# Patient Record
Sex: FEMALE | Race: WHITE
[De-identification: names, ages, dates, MRNs, and addresses within clinical notes are randomized per-mention and may not be internally consistent; named-entity substitution may affect disease eponyms.]

---

## 2019-10-17 ENCOUNTER — HOSPITAL ENCOUNTER (EMERGENCY)
Dept: HOSPITAL 76 - ED | Age: 39
Discharge: HOME | End: 2019-10-17
Payer: COMMERCIAL

## 2019-10-17 VITALS — DIASTOLIC BLOOD PRESSURE: 86 MMHG | SYSTOLIC BLOOD PRESSURE: 131 MMHG

## 2019-10-17 DIAGNOSIS — F43.20: ICD-10-CM

## 2019-10-17 DIAGNOSIS — R07.89: Primary | ICD-10-CM

## 2019-10-17 LAB
ALBUMIN DIAFP-MCNC: 4.1 G/DL (ref 3.2–5.5)
ALBUMIN/GLOB SERPL: 1.2 {RATIO} (ref 1–2.2)
ALP SERPL-CCNC: 56 IU/L (ref 42–121)
ALT SERPL W P-5'-P-CCNC: 15 IU/L (ref 10–60)
ANION GAP SERPL CALCULATED.4IONS-SCNC: 6 MMOL/L (ref 6–13)
AST SERPL W P-5'-P-CCNC: 19 IU/L (ref 10–42)
BASOPHILS NFR BLD AUTO: 0 10^3/UL (ref 0–0.1)
BASOPHILS NFR BLD AUTO: 0.5 %
BILIRUB BLD-MCNC: 0.6 MG/DL (ref 0.2–1)
BUN SERPL-MCNC: 11 MG/DL (ref 6–20)
CALCIUM UR-MCNC: 9.1 MG/DL (ref 8.5–10.3)
CHLORIDE SERPL-SCNC: 105 MMOL/L (ref 101–111)
CO2 SERPL-SCNC: 28 MMOL/L (ref 21–32)
CREAT SERPLBLD-SCNC: 0.8 MG/DL (ref 0.4–1)
EOSINOPHIL # BLD AUTO: 0.1 10^3/UL (ref 0–0.7)
EOSINOPHIL NFR BLD AUTO: 2.5 %
ERYTHROCYTE [DISTWIDTH] IN BLOOD BY AUTOMATED COUNT: 12.2 % (ref 12–15)
GFRSERPLBLD MDRD-ARVRAT: 79 ML/MIN/{1.73_M2} (ref 89–?)
GLOBULIN SER-MCNC: 3.5 G/DL (ref 2.1–4.2)
GLUCOSE SERPL-MCNC: 103 MG/DL (ref 70–100)
HGB UR QL STRIP: 13.2 G/DL (ref 12–16)
LIPASE SERPL-CCNC: 33 U/L (ref 22–51)
LYMPHOCYTES # SPEC AUTO: 2 10^3/UL (ref 1.5–3.5)
LYMPHOCYTES NFR BLD AUTO: 36 %
MCH RBC QN AUTO: 30.3 PG (ref 27–31)
MCHC RBC AUTO-ENTMCNC: 31.7 G/DL (ref 32–36)
MCV RBC AUTO: 95.6 FL (ref 81–99)
MONOCYTES # BLD AUTO: 0.4 10^3/UL (ref 0–1)
MONOCYTES NFR BLD AUTO: 7.3 %
NEUTROPHILS # BLD AUTO: 3 10^3/UL (ref 1.5–6.6)
NEUTROPHILS # SNV AUTO: 5.6 X10^3/UL (ref 4.8–10.8)
NEUTROPHILS NFR BLD AUTO: 53.3 %
PDW BLD AUTO: 10.4 FL (ref 7.9–10.8)
PLATELET # BLD: 179 10^3/UL (ref 130–450)
PROT SPEC-MCNC: 7.6 G/DL (ref 6.7–8.2)
RBC MAR: 4.35 10^6/UL (ref 4.2–5.4)
SODIUM SERPLBLD-SCNC: 139 MMOL/L (ref 135–145)

## 2019-10-17 PROCEDURE — 71046 X-RAY EXAM CHEST 2 VIEWS: CPT

## 2019-10-17 PROCEDURE — 83690 ASSAY OF LIPASE: CPT

## 2019-10-17 PROCEDURE — 93005 ELECTROCARDIOGRAM TRACING: CPT

## 2019-10-17 PROCEDURE — 85379 FIBRIN DEGRADATION QUANT: CPT

## 2019-10-17 PROCEDURE — 80053 COMPREHEN METABOLIC PANEL: CPT

## 2019-10-17 PROCEDURE — 99284 EMERGENCY DEPT VISIT MOD MDM: CPT

## 2019-10-17 PROCEDURE — 36415 COLL VENOUS BLD VENIPUNCTURE: CPT

## 2019-10-17 PROCEDURE — 85025 COMPLETE CBC W/AUTO DIFF WBC: CPT

## 2019-10-17 PROCEDURE — 84484 ASSAY OF TROPONIN QUANT: CPT

## 2019-10-17 NOTE — XRAY REPORT
Reason:  chest pain

Procedure Date:  10/17/2019   

Accession Number:  [id number] / [id number]                    

Procedure:  XR  - Chest 2 View X-Ray CPT Code:  32505

 

FULL RESULT:

 

 

EXAM:

CHEST RADIOGRAPHY

 

EXAM DATE: 10/17/2019 09:23 AM.

 

CLINICAL HISTORY: Chest pain for 2 weeks. More on left side towards 

apices.

 

COMPARISON: None.

 

TECHNIQUE: 2 views.

 

FINDINGS:

Lungs/Pleura: No focal opacities evident. No pleural effusion. No 

pneumothorax. Normal volumes.

 

Mediastinum: Heart and mediastinal contours are unremarkable.

 

Other: None.

IMPRESSION: Normal 2-view chest radiography.

 

[facility]

## 2019-10-17 NOTE — ED PHYSICIAN DOCUMENTATION
PD HPI CHEST PAIN





- Stated complaint


Stated Complaint: CP





- Chief complaint


Chief Complaint: Cardiac





- History obtained from


History obtained from: Patient





- History of Present Illness


Timing - onset: How many weeks ago (2)


Timing - onset during: Rest


Timing - duration: Weeks (2)


Timing - details: Gradual onset, Still present, Waxing and waning


Quality: Pressure, Tightness, Sharp


Location: Left chest


Radiation: Left upper extremity


Worsened by: Other (nothing)


Similar symptoms before: Has not had sx before


Recently seen: Not recently seen





- Additional information


Additional information: 





Previously well 39-year-old female is developed some pain in her left chest at 

the junction to the sternum and she is found that she periodically will have 

some numbness in her left arm as well.  She feels she might have been having 

some trouble getting a full deep breath periodically.  She states the pain is 

coming and going and lasting a minute every few minutes.  She has not had this 

previously.  She does acknowledge significant stress with the recent death of 

someone close to her.  She has had some sobbing at night.





Review of Systems


Constitutional: denies: Fever


Eyes: denies: Decreased vision


Ears: denies: Ear pain


Nose: denies: Rhinorrhea / runny nose, Congestion


Throat: denies: Sore throat


Cardiac: reports: Chest pain / pressure.  denies: Palpitations, Pedal edema, 

Calf pain


Respiratory: denies: Dyspnea, Cough, Wheezing


GI: denies: Abdominal Pain, Nausea, Vomiting


: denies: Dysuria, Frequency


Skin: denies: Rash


Musculoskeletal: denies: Neck pain, Back pain, Extremity pain


Neurologic: denies: Generalized weakness, Focal weakness, Numbness, Confused, 

Altered mental status, Headache, Head injury, LOC





PD PAST MEDICAL HISTORY





- Allergies


Allergies/Adverse Reactions: 


                                    Allergies











Allergy/AdvReac Type Severity Reaction Status Date / Time


 


amoxicillin Allergy  Rash Verified 10/17/19 08:33














PD ED PE NORMAL





- Vitals


Vital signs reviewed: Yes (hypertensive )





- General


General: Alert and oriented X 3, No acute distress, Well developed/nourished





- HEENT


HEENT: Atraumatic, PERRL, EOMI





- Neck


Neck: Supple, no meningeal sign, No bony TTP





- Cardiac


Cardiac: RRR, No murmur





- Respiratory


Respiratory: No respiratory distress, Clear bilaterally, Other (There is 

tenderness to the chest wall on the left side at the costo-sternal junction 

reproducing the pain the patient is experiencing. )





- Abdomen


Abdomen: Normal bowel sounds, Soft, Non tender, Non distended, No organomegaly





- Back


Back: No CVA TTP, No spinal TTP





- Derm


Derm: Normal color, Warm and dry, No rash





- Extremities


Extremities: No deformity, No edema





- Neuro


Neuro: Alert and oriented X 3, CNs 2-12 intact, No motor deficit, No sensory 

deficit, Normal speech


Eye Opening: Spontaneous


Motor: Obeys Commands


Verbal: Oriented


GCS Score: 15





- Psych


Psych: Normal mood, Normal affect





Results





- Vitals


Vitals: 


                               Vital Signs - 24 hr











  10/17/19





  08:28


 


Temperature 37.1 C


 


Heart Rate 72


 


Respiratory 20





Rate 


 


Blood Pressure 138/93 H


 


O2 Saturation 97








                                     Oxygen











O2 Source                      Room air

















- EKG (time done)


  ** 0828


Rate: Rate (enter#) (63)


Rhythm: NSR


Ischemia: Normal ST segments


Compare to prior EKG: Old EKG unavailable


Computer interpretation: Agree with computer





- Labs


Labs: 


                                Laboratory Tests











  10/17/19 10/17/19 10/17/19





  09:20 09:20 09:20


 


WBC  5.6  


 


RBC  4.35  


 


Hgb  13.2  


 


Hct  41.6  


 


MCV  95.6  


 


MCH  30.3  


 


MCHC  31.7 L  


 


RDW  12.2  


 


Plt Count  179  


 


MPV  10.4  


 


Neut # (Auto)  3.0  


 


Lymph # (Auto)  2.0  


 


Mono # (Auto)  0.4  


 


Eos # (Auto)  0.1  


 


Baso # (Auto)  0.0  


 


Absolute Nucleated RBC  0.00  


 


Nucleated RBC %  0.0  


 


D-Dimer   


 


Sodium   139 


 


Potassium   4.1 


 


Chloride   105 


 


Carbon Dioxide   28 


 


Anion Gap   6.0 


 


BUN   11 


 


Creatinine   0.8 


 


Estimated GFR (MDRD)   79 L 


 


Glucose   103 H 


 


Calcium   9.1 


 


Total Bilirubin   0.6 


 


AST   19 


 


ALT   15 


 


Alkaline Phosphatase   56 


 


Troponin I High Sens    < 2.3 L


 


Total Protein   7.6 


 


Albumin   4.1 


 


Globulin   3.5 


 


Albumin/Globulin Ratio   1.2 


 


Lipase   33 














  10/17/19





  09:20


 


WBC 


 


RBC 


 


Hgb 


 


Hct 


 


MCV 


 


MCH 


 


MCHC 


 


RDW 


 


Plt Count 


 


MPV 


 


Neut # (Auto) 


 


Lymph # (Auto) 


 


Mono # (Auto) 


 


Eos # (Auto) 


 


Baso # (Auto) 


 


Absolute Nucleated RBC 


 


Nucleated RBC % 


 


D-Dimer  < 200.0 L


 


Sodium 


 


Potassium 


 


Chloride 


 


Carbon Dioxide 


 


Anion Gap 


 


BUN 


 


Creatinine 


 


Estimated GFR (MDRD) 


 


Glucose 


 


Calcium 


 


Total Bilirubin 


 


AST 


 


ALT 


 


Alkaline Phosphatase 


 


Troponin I High Sens 


 


Total Protein 


 


Albumin 


 


Globulin 


 


Albumin/Globulin Ratio 


 


Lipase 














- Rads (name of study)


  ** chest 2 view


Radiology: Prelim report reviewed (Impression: Normal 2 view chest 

radiography.), EMP read indepedently, See rad report





PD MEDICAL DECISION MAKING





- ED course


Complexity details: reviewed results, re-evaluated patient, considered 

differential, d/w patient


ED course: 





Previously well 39-year-old female has developed some pain in the left chest 

wall that is reproducible with palpation and she acknowledges some significant 

stress with a death of a close friend. She is administered PO decadron and her 

diagnostics are otherwise unremarkable. 





Departure





- Departure


Disposition: 01 Home, Self Care


Clinical Impression: 


 Chest wall pain, Stress and adjustment reaction





Condition: Stable


Instructions:  ED Stress React, ED Chest Pain Costochondritis


Follow-Up: 


Leonel Andersen MD [Primary Care Provider] -

## 2020-01-21 ENCOUNTER — HOSPITAL ENCOUNTER (OUTPATIENT)
Dept: HOSPITAL 76 - LAB.R | Age: 40
Discharge: HOME | End: 2020-01-21
Attending: NURSE PRACTITIONER
Payer: COMMERCIAL

## 2020-01-21 DIAGNOSIS — J34.89: Primary | ICD-10-CM

## 2020-01-21 PROCEDURE — 87205 SMEAR GRAM STAIN: CPT

## 2020-01-21 PROCEDURE — 87081 CULTURE SCREEN ONLY: CPT

## 2020-01-21 PROCEDURE — 87070 CULTURE OTHR SPECIMN AEROBIC: CPT

## 2020-10-10 ENCOUNTER — HOSPITAL ENCOUNTER (EMERGENCY)
Dept: HOSPITAL 76 - ED | Age: 40
Discharge: HOME | End: 2020-10-10
Payer: COMMERCIAL

## 2020-10-10 VITALS — DIASTOLIC BLOOD PRESSURE: 93 MMHG | SYSTOLIC BLOOD PRESSURE: 157 MMHG

## 2020-10-10 DIAGNOSIS — R20.0: ICD-10-CM

## 2020-10-10 DIAGNOSIS — R51.9: ICD-10-CM

## 2020-10-10 DIAGNOSIS — F41.9: Primary | ICD-10-CM

## 2020-10-10 DIAGNOSIS — R03.0: ICD-10-CM

## 2020-10-10 LAB
ALBUMIN DIAFP-MCNC: 4.4 G/DL (ref 3.2–5.5)
ALBUMIN/GLOB SERPL: 1.2 {RATIO} (ref 1–2.2)
ALP SERPL-CCNC: 71 IU/L (ref 42–121)
ALT SERPL W P-5'-P-CCNC: 16 IU/L (ref 10–60)
ANION GAP SERPL CALCULATED.4IONS-SCNC: 11 MMOL/L (ref 6–13)
AST SERPL W P-5'-P-CCNC: 18 IU/L (ref 10–42)
BASOPHILS NFR BLD AUTO: 0 10^3/UL (ref 0–0.1)
BASOPHILS NFR BLD AUTO: 0.6 %
BILIRUB BLD-MCNC: 0.6 MG/DL (ref 0.2–1)
BUN SERPL-MCNC: 14 MG/DL (ref 6–20)
CALCIUM UR-MCNC: 9.5 MG/DL (ref 8.5–10.3)
CHLORIDE SERPL-SCNC: 104 MMOL/L (ref 101–111)
CO2 SERPL-SCNC: 27 MMOL/L (ref 21–32)
CREAT SERPLBLD-SCNC: 0.8 MG/DL (ref 0.4–1)
EOSINOPHIL # BLD AUTO: 0.1 10^3/UL (ref 0–0.7)
EOSINOPHIL NFR BLD AUTO: 1.9 %
ERYTHROCYTE [DISTWIDTH] IN BLOOD BY AUTOMATED COUNT: 12 % (ref 12–15)
GLOBULIN SER-MCNC: 3.8 G/DL (ref 2.1–4.2)
GLUCOSE SERPL-MCNC: 98 MG/DL (ref 70–100)
HGB UR QL STRIP: 13.7 G/DL (ref 12–16)
LIPASE SERPL-CCNC: 30 U/L (ref 22–51)
LYMPHOCYTES # SPEC AUTO: 1.9 10^3/UL (ref 1.5–3.5)
LYMPHOCYTES NFR BLD AUTO: 27.4 %
MCH RBC QN AUTO: 31.5 PG (ref 27–31)
MCHC RBC AUTO-ENTMCNC: 32.8 G/DL (ref 32–36)
MCV RBC AUTO: 96.1 FL (ref 81–99)
MONOCYTES # BLD AUTO: 0.5 10^3/UL (ref 0–1)
MONOCYTES NFR BLD AUTO: 6.5 %
NEUTROPHILS # BLD AUTO: 4.4 10^3/UL (ref 1.5–6.6)
NEUTROPHILS # SNV AUTO: 7 X10^3/UL (ref 4.8–10.8)
NEUTROPHILS NFR BLD AUTO: 63.3 %
PDW BLD AUTO: 10.6 FL (ref 7.9–10.8)
PLATELET # BLD: 210 10^3/UL (ref 130–450)
PROT SPEC-MCNC: 8.2 G/DL (ref 6.7–8.2)
RBC MAR: 4.35 10^6/UL (ref 4.2–5.4)
SODIUM SERPLBLD-SCNC: 142 MMOL/L (ref 135–145)

## 2020-10-10 PROCEDURE — 83690 ASSAY OF LIPASE: CPT

## 2020-10-10 PROCEDURE — 93005 ELECTROCARDIOGRAM TRACING: CPT

## 2020-10-10 PROCEDURE — 99284 EMERGENCY DEPT VISIT MOD MDM: CPT

## 2020-10-10 PROCEDURE — 80053 COMPREHEN METABOLIC PANEL: CPT

## 2020-10-10 PROCEDURE — 84484 ASSAY OF TROPONIN QUANT: CPT

## 2020-10-10 PROCEDURE — 36415 COLL VENOUS BLD VENIPUNCTURE: CPT

## 2020-10-10 PROCEDURE — 85025 COMPLETE CBC W/AUTO DIFF WBC: CPT

## 2020-10-10 PROCEDURE — 70496 CT ANGIOGRAPHY HEAD: CPT

## 2020-10-10 NOTE — CT REPORT
PROCEDURE:  ANGIO HEAD W/WO

 

INDICATIONS:  headache, FHX aneurysm

 

CONTRAST:  IV CONTRAST: Optiray 320 ml: 80 PO CONTRAST: *NO PO CONTRAST 

 

TECHNIQUE:  

Precontrast 4.5 mm thick angled axial sections acquired from the foramen magnum to the vertex.   Afte
r the administration of intravenous contrast, 1 mm thick sections acquired through the [place]
is.  Postcontrast 4.5 mm thick sections then re-acquired from the foramen magnum to the vertex.  3-di
mensional maximum-intensity-projection (MIP) and/or volume rendering reformats were acquired of the c
entral intracranial vasculature.  For radiation dose reduction, the following was used:  automated ex
posure control, adjustment of mA and/or kV according to patient size.

 

COMPARISON: None. 

 

FINDINGS:  

Image quality:  Excellent.  

 

Anterior circulation:  Intracranial internal carotid arteries are normal in size and flow.  The flow 
within the paired anterior cerebral arteries is normal and symmetric.  The flow within the middle cer
ebral arteries is normal and symmetric.  The anterior communicating artery is seen.  No aneurysms are
 seen.  

 

Posterior circulation:  Visualized portions of the vertebral arteries demonstrate normal caliber, and
 join to form a normal appearing basilar artery.  Flow within the posterior cerebral arteries is norm
al and symmetric.  No aneurysms are seen.  

 

CSF spaces:  Ventricles are normal in size and shape.  Basal cisterns are patent.  No extra-axial flu
id collections.  

 

Brain:  No midline shift.  No intracranial bleeds or masses.  Gray-white matter interface appears int
act.  

 

Skull and face:  Calvarium and facial bones appear intact, without suspicious lesions.  

 

Sinuses:  Visualized sinuses and mastoids are clear.  

 

IMPRESSION:  

CT without acute intracranial abnormalities or acute intracranial hemorrhage. No mass or mass effect.


 

Negative CT angiogram of the intracranial arterial vasculature.

 

Reviewed by: Hari Duncan MD on 10/10/2020 3:30 PM PDT

Approved by: Hari Duncan MD on 10/10/2020 3:30 PM PDT

 

 

Station ID:  [id number]

## 2020-10-10 NOTE — ED PHYSICIAN DOCUMENTATION
History of Present Illness





- Stated complaint


Stated Complaint: HIGH BLOOD PRESSURE





- Chief complaint


Chief Complaint: Cardiac





- History obtained from


History obtained from: Patient





- Additonal information


Additional information: 





Previously healthy 40-year-old woman presents with multiple complaints that are 

very concerning to her.  For the last month or so she has had a constant feeling

of deadness of her left arm that is not painful.  It is not weak.  There is no 

associated chest pain or trouble breathing.  Over the last couple of weeks she 

has had an intermittent "fluttering" usually in the left supraorbital area but 

sometimes on the right as well.  Associated with mildly blurry vision.  No 

headaches per se.  All this together associated with sore breasts made her 

worried about heart conditions or about the aneurysm that took her sister 

prematurely in her brain and she checked her blood pressure twice and it was 

150/80 and subsequently 180/100.





Review of Systems


Ten Systems: 10 systems reviewed and negative


Constitutional: denies: Fever, Chills, Fatigue


Nose: denies: Rhinorrhea / runny nose, Congestion


Throat: denies: Sore throat


Cardiac: denies: Chest pain / pressure, Palpitations, Pedal edema, Calf pain


Respiratory: denies: Dyspnea, Cough





PD PAST MEDICAL HISTORY





- Present Medications


Home Medications: 


                                Ambulatory Orders











 Medication  Instructions  Recorded  Confirmed


 


Propranolol [Inderal] 10 mg PO BID #120 tablet 10/10/20 














- Allergies


Allergies/Adverse Reactions: 


                                    Allergies











Allergy/AdvReac Type Severity Reaction Status Date / Time


 


amoxicillin Allergy  Rash Verified 10/17/19 08:33














PD ED PE NORMAL





- Vitals


Vital signs reviewed: Yes





- General


General: Alert and oriented X 3, No acute distress





- HEENT


HEENT: PERRL, EOMI, Pharynx benign





- Neck


Neck: Supple, no meningeal sign, No bony TTP





- Cardiac


Cardiac: RRR, No murmur





- Respiratory


Respiratory: No respiratory distress, Clear bilaterally





- Abdomen


Abdomen: Non tender





- Back


Back: No CVA TTP, No spinal TTP





- Derm


Derm: Normal color, Warm and dry





- Extremities


Extremities: No edema, No calf tenderness / cord





- Neuro


Neuro: Alert and oriented X 3, CNs 2-12 intact, No motor deficit, No sensory 

deficit, Normal speech


Eye Opening: Spontaneous


Motor: Obeys Commands


Verbal: Oriented


GCS Score: 15





Results





- Vitals


Vitals: 


                               Vital Signs - 24 hr











  10/10/20 10/10/20 10/10/20





  13:50 14:12 15:01


 


Temperature 36.9 C 37.2 C 36.4 C L


 


Heart Rate 91 78 85


 


Respiratory 20 16 22





Rate   


 


Blood Pressure 144/107 H 151/84 H 137/92 H


 


O2 Saturation 98 100 100














  10/10/20





  15:59


 


Temperature 37.1 C


 


Heart Rate 67


 


Respiratory 18





Rate 


 


Blood Pressure 157/93 H


 


O2 Saturation 99








                                     Oxygen











O2 Source                      Room air

















- EKG (time done)


  ** 1359


Rate: Rate (enter#) (72)


Rhythm: NSR


Axis: Normal


Intervals: Normal SD


QRS: Normal


Ischemia: Normal ST segments


Computer interpretation: Agree with computer





- Labs


Labs: 


                                Laboratory Tests











  10/10/20 10/10/20 10/10/20





  14:05 14:05 14:05


 


WBC  7.0  


 


RBC  4.35  


 


Hgb  13.7  


 


Hct  41.8  


 


MCV  96.1  


 


MCH  31.5 H  


 


MCHC  32.8  


 


RDW  12.0  


 


Plt Count  210  


 


MPV  10.6  


 


Neut # (Auto)  4.4  


 


Lymph # (Auto)  1.9  


 


Mono # (Auto)  0.5  


 


Eos # (Auto)  0.1  


 


Baso # (Auto)  0.0  


 


Absolute Nucleated RBC  0.00  


 


Nucleated RBC %  0.0  


 


Sodium   142 


 


Potassium   3.6 


 


Chloride   104 


 


Carbon Dioxide   27 


 


Anion Gap   11.0 


 


BUN   14 


 


Creatinine   0.8 


 


Estimated GFR (MDRD)   79 L 


 


Glucose   98 


 


Calcium   9.5 


 


Total Bilirubin   0.6 


 


AST   18 


 


ALT   16 


 


Alkaline Phosphatase   71 


 


Troponin I High Sens    < 2.3 L


 


Total Protein   8.2 


 


Albumin   4.4 


 


Globulin   3.8 


 


Albumin/Globulin Ratio   1.2 


 


Lipase   30 














- Rads (name of study)


  ** CTA Head


Radiology: EMP read contemporaneously (normal)





PD MEDICAL DECISION MAKING





- ED course


ED course: 





40-year-old woman presents with left arm numbness, but no evidence of stroke and

 is longstanding, month.  She also has some other atypical symptoms.  Her work-

up here was completely negative.  In talking to her at length it became clear 

that anxiety is a considerable portion of her symptoms and she admits as such.  

We talked about potential treatments for this and she is given a prescription fo

r propranolol which she may try.





Departure





- Departure


Disposition: 01 Home, Self Care


Clinical Impression: 


 Anxiety, Headache above the eye region, Arm numbness left, Elevated blood 

pressure reading without diagnosis of hypertension





Condition: Good


Record reviewed to determine appropriate education?: Yes


Instructions:  ED Stress React


Prescriptions: 


Propranolol [Inderal] 10 mg PO BID #120 tablet


Comments: 


CT angiography of your head was normal, this rules out aneurysm.  Basic blood 

work and EKG were also normal.  As discussed, I think anxiety is affecting you 

personally, the propranolol should help with that and may help with mildly 

elevated blood pressures as well.  Return if you worsen.  If you want to talk 

with your doctor about further treatment for anxiety, consider an antidepressant

 such as Lexapro.  Also cardiac exercise is important.


Discharge Date/Time: 10/10/20 16:00

## 2020-10-24 ENCOUNTER — HOSPITAL ENCOUNTER (OUTPATIENT)
Dept: HOSPITAL 76 - LAB.S | Age: 40
Discharge: HOME | End: 2020-10-24
Attending: NURSE PRACTITIONER
Payer: COMMERCIAL

## 2020-10-24 DIAGNOSIS — R63.5: ICD-10-CM

## 2020-10-24 DIAGNOSIS — Z83.3: Primary | ICD-10-CM

## 2020-10-24 LAB — HBA1C MFR BLD HPLC: 4.9 % (ref 4.27–6.07)

## 2020-10-24 PROCEDURE — 36415 COLL VENOUS BLD VENIPUNCTURE: CPT

## 2020-10-24 PROCEDURE — 83036 HEMOGLOBIN GLYCOSYLATED A1C: CPT

## 2020-10-24 PROCEDURE — 84443 ASSAY THYROID STIM HORMONE: CPT

## 2020-11-06 ENCOUNTER — HOSPITAL ENCOUNTER (OUTPATIENT)
Dept: HOSPITAL 76 - LAB.R | Age: 40
Discharge: HOME | End: 2020-11-06
Attending: NURSE PRACTITIONER
Payer: COMMERCIAL

## 2020-11-06 DIAGNOSIS — J34.89: Primary | ICD-10-CM

## 2020-11-06 PROCEDURE — 87205 SMEAR GRAM STAIN: CPT

## 2020-11-06 PROCEDURE — 87070 CULTURE OTHR SPECIMN AEROBIC: CPT

## 2020-12-14 ENCOUNTER — HOSPITAL ENCOUNTER (OUTPATIENT)
Dept: HOSPITAL 76 - COV | Age: 40
Discharge: HOME | End: 2020-12-14
Attending: FAMILY MEDICINE
Payer: COMMERCIAL

## 2020-12-14 DIAGNOSIS — R09.81: Primary | ICD-10-CM

## 2020-12-14 DIAGNOSIS — J34.89: ICD-10-CM

## 2020-12-14 DIAGNOSIS — Z20.828: ICD-10-CM

## 2021-07-13 ENCOUNTER — HOSPITAL ENCOUNTER (OUTPATIENT)
Dept: HOSPITAL 76 - COV | Age: 41
Discharge: HOME | End: 2021-07-13
Attending: FAMILY MEDICINE
Payer: COMMERCIAL

## 2021-07-13 DIAGNOSIS — Z20.822: ICD-10-CM

## 2021-07-13 DIAGNOSIS — J34.89: ICD-10-CM

## 2021-07-13 DIAGNOSIS — R09.81: Primary | ICD-10-CM

## 2022-11-11 ENCOUNTER — HOSPITAL ENCOUNTER (OUTPATIENT)
Dept: HOSPITAL 76 - DI.S | Age: 42
Discharge: HOME | End: 2022-11-11
Attending: PHYSICIAN ASSISTANT
Payer: COMMERCIAL

## 2022-11-11 DIAGNOSIS — M25.571: Primary | ICD-10-CM

## 2022-11-11 NOTE — XRAY REPORT
PROCEDURE:  Ankle 3 View RT

 

INDICATIONS:  RIGHT ANKLE PAIN

 

TECHNIQUE:  3 views of the ankle were acquired.  

 

COMPARISON:  None.

 

FINDINGS:  

 

Bones:  No fractures or dislocations.  Ankle mortise is normally aligned.  No suspicious bony lesions
.  

 

Soft tissues:  No tibiotalar joint effusion.  Achilles tendon appears normal.  There is soft tissue s
welling asymmetrically prominent over the lateral malleolus.

 

IMPRESSION:  A fracture is not found in the ankle mortise joint is normally aligned but there is asym
metric prominent soft tissue swelling over the lateral malleolus, which may indicate ligamentous inju
ry in that area after trauma.

 

Reviewed by: Eugenio Richard MD on 11/11/2022 3:57 PM PST

Approved by: Eugenio Richard MD on 11/11/2022 3:57 PM PST

 

 

Station ID:  [id number]

## 2023-06-12 ENCOUNTER — HOSPITAL ENCOUNTER (OUTPATIENT)
Dept: HOSPITAL 76 - LAB.S | Age: 43
Discharge: HOME | End: 2023-06-12
Attending: NURSE PRACTITIONER
Payer: COMMERCIAL

## 2023-06-12 DIAGNOSIS — E78.5: Primary | ICD-10-CM

## 2023-06-12 DIAGNOSIS — Z79.899: ICD-10-CM

## 2023-06-12 LAB
ALBUMIN DIAFP-MCNC: 3.8 G/DL (ref 3.2–5.5)
ALBUMIN/GLOB SERPL: 1.1 {RATIO} (ref 1–2.2)
ALP SERPL-CCNC: 65 IU/L (ref 42–121)
ALT SERPL W P-5'-P-CCNC: 15 IU/L (ref 10–60)
ANION GAP SERPL CALCULATED.4IONS-SCNC: 4 MMOL/L (ref 6–13)
AST SERPL W P-5'-P-CCNC: 16 IU/L (ref 10–42)
BASOPHILS NFR BLD AUTO: 0 10^3/UL (ref 0–0.1)
BASOPHILS NFR BLD AUTO: 0.6 %
BILIRUB BLD-MCNC: 0.6 MG/DL (ref 0.2–1)
BUN SERPL-MCNC: 11 MG/DL (ref 6–20)
CALCIUM UR-MCNC: 8.7 MG/DL (ref 8.5–10.3)
CHLORIDE SERPL-SCNC: 110 MMOL/L (ref 101–111)
CHOLEST SERPL-MCNC: 188 MG/DL
CO2 SERPL-SCNC: 25 MMOL/L (ref 21–32)
CREAT SERPLBLD-SCNC: 0.8 MG/DL (ref 0.4–1)
EOSINOPHIL # BLD AUTO: 0.1 10^3/UL (ref 0–0.7)
EOSINOPHIL NFR BLD AUTO: 2.7 %
ERYTHROCYTE [DISTWIDTH] IN BLOOD BY AUTOMATED COUNT: 12.7 % (ref 12–15)
GFRSERPLBLD MDRD-ARVRAT: 78 ML/MIN/{1.73_M2} (ref 89–?)
GLOBULIN SER-MCNC: 3.6 G/DL (ref 2.1–4.2)
GLUCOSE SERPL-MCNC: 104 MG/DL (ref 70–100)
HCT VFR BLD AUTO: 40.6 % (ref 37–47)
HDLC SERPL-MCNC: 40 MG/DL
HDLC SERPL: 4.7 {RATIO} (ref ?–4.4)
HGB UR QL STRIP: 12.9 G/DL (ref 12–16)
LDLC SERPL CALC-MCNC: 121 MG/DL
LDLC/HDLC SERPL: 3 {RATIO} (ref ?–4.4)
LYMPHOCYTES # SPEC AUTO: 1.7 10^3/UL (ref 1.5–3.5)
LYMPHOCYTES NFR BLD AUTO: 32.6 %
MCH RBC QN AUTO: 30.5 PG (ref 27–31)
MCHC RBC AUTO-ENTMCNC: 31.8 G/DL (ref 32–36)
MCV RBC AUTO: 96 FL (ref 81–99)
MONOCYTES # BLD AUTO: 0.5 10^3/UL (ref 0–1)
MONOCYTES NFR BLD AUTO: 8.7 %
NEUTROPHILS # BLD AUTO: 2.9 10^3/UL (ref 1.5–6.6)
NEUTROPHILS # SNV AUTO: 5.3 X10^3/UL (ref 4.8–10.8)
NEUTROPHILS NFR BLD AUTO: 55.2 %
NRBC # BLD AUTO: 0 /100WBC
NRBC # BLD AUTO: 0 X10^3/UL
PDW BLD AUTO: 11.6 FL (ref 7.9–10.8)
PLATELET # BLD: 184 10^3/UL (ref 130–450)
POTASSIUM SERPL-SCNC: 4.3 MMOL/L (ref 3.5–5)
PROT SPEC-MCNC: 7.4 G/DL (ref 6.7–8.2)
RBC MAR: 4.23 10^6/UL (ref 4.2–5.4)
SODIUM SERPLBLD-SCNC: 139 MMOL/L (ref 135–145)
TRIGL P FAST SERPL-MCNC: 134 MG/DL
TSH SERPL-ACNC: 2.12 UIU/ML (ref 0.34–5.6)
VLDLC SERPL-SCNC: 27 MG/DL

## 2023-06-12 PROCEDURE — 83721 ASSAY OF BLOOD LIPOPROTEIN: CPT

## 2023-06-12 PROCEDURE — 85025 COMPLETE CBC W/AUTO DIFF WBC: CPT

## 2023-06-12 PROCEDURE — 80061 LIPID PANEL: CPT

## 2023-06-12 PROCEDURE — 36415 COLL VENOUS BLD VENIPUNCTURE: CPT

## 2023-06-12 PROCEDURE — 84443 ASSAY THYROID STIM HORMONE: CPT

## 2023-06-12 PROCEDURE — 80053 COMPREHEN METABOLIC PANEL: CPT
